# Patient Record
Sex: MALE | Race: WHITE | URBAN - METROPOLITAN AREA
[De-identification: names, ages, dates, MRNs, and addresses within clinical notes are randomized per-mention and may not be internally consistent; named-entity substitution may affect disease eponyms.]

---

## 2018-10-04 ENCOUNTER — HOSPITAL ENCOUNTER (OUTPATIENT)
Dept: MEDSURG UNIT | Facility: HOSPITAL | Age: 28
End: 2018-10-05
Attending: INTERNAL MEDICINE | Admitting: INTERNAL MEDICINE

## 2018-10-04 LAB
ABS NEUT (OLG): 13.3 X10(3)/MCL (ref 1.5–6.9)
ALBUMIN SERPL-MCNC: 6 GM/DL (ref 3.4–5)
ALBUMIN/GLOB SERPL: 1.4 RATIO
ALP SERPL-CCNC: 96 UNIT/L (ref 30–113)
ALT SERPL-CCNC: 28 UNIT/L (ref 10–45)
AMPHET UR QL SCN: NORMAL
AMYLASE SERPL-CCNC: 95 UNIT/L (ref 25–115)
APPEARANCE, UA: ABNORMAL
APTT PPP: 27.2 SECOND(S) (ref 25–35)
AST SERPL-CCNC: 24 UNIT/L (ref 15–37)
BACTERIA SPEC CULT: ABNORMAL /HPF
BARBITURATE SCN PRESENT UR: NORMAL
BASOPHILS # BLD AUTO: 0.1 X10(3)/MCL (ref 0–0.1)
BASOPHILS NFR BLD AUTO: 0 % (ref 0–1)
BENZODIAZ UR QL SCN: NORMAL
BILIRUB SERPL-MCNC: 0.9 MG/DL (ref 0.1–0.9)
BILIRUB UR QL STRIP: NEGATIVE
BILIRUBIN DIRECT+TOT PNL SERPL-MCNC: 0.2 MG/DL (ref 0–0.3)
BILIRUBIN DIRECT+TOT PNL SERPL-MCNC: 0.7 MG/DL
BUN SERPL-MCNC: 56 MG/DL (ref 10–20)
CALCIUM SERPL-MCNC: 10.4 MG/DL (ref 8–10.5)
CANNABINOIDS UR QL SCN: NORMAL
CHLORIDE SERPL-SCNC: 93 MMOL/L (ref 100–108)
CK SERPL-CCNC: 837 UNIT/L (ref 39–225)
CO2 SERPL-SCNC: 24 MMOL/L (ref 21–35)
COCAINE UR QL SCN: NORMAL
COLOR UR: ABNORMAL
CREAT SERPL-MCNC: 3.86 MG/DL (ref 0.7–1.3)
EOSINOPHIL # BLD AUTO: 0 X10(3)/MCL (ref 0–0.6)
EOSINOPHIL NFR BLD AUTO: 0 % (ref 0–5)
ERYTHROCYTE [DISTWIDTH] IN BLOOD BY AUTOMATED COUNT: 11.9 % (ref 11.5–17)
GLOBULIN SER-MCNC: 4.2 GM/DL
GLUCOSE (UA): NEGATIVE
GLUCOSE SERPL-MCNC: 115 MG/DL (ref 75–116)
HCT VFR BLD AUTO: 53.7 % (ref 42–52)
HGB BLD-MCNC: 18.7 GM/DL (ref 14–18)
HGB UR QL STRIP: ABNORMAL
HYALINE CASTS #/AREA URNS LPF: ABNORMAL /LPF
IMM GRANULOCYTES # BLD AUTO: 0.07 10*3/UL (ref 0–0.02)
IMM GRANULOCYTES NFR BLD AUTO: 0.4 % (ref 0–0.43)
INR PPP: 1.1 (ref 0–1.2)
KETONES UR QL STRIP: 15 MG/DL
LEUKOCYTE ESTERASE UR QL STRIP: NEGATIVE
LIPASE SERPL-CCNC: 165 UNIT/L (ref 21–261)
LYMPHOCYTES # BLD AUTO: 2.8 X10(3)/MCL (ref 0.5–4.1)
LYMPHOCYTES NFR BLD AUTO: 16 % (ref 15–40)
MCH RBC QN AUTO: 31 PG (ref 27–34)
MCHC RBC AUTO-ENTMCNC: 35 GM/DL (ref 31–36)
MCV RBC AUTO: 88 FL (ref 80–99)
MDMA UR QL SCN: NORMAL
METHADONE UR QL SCN: NORMAL
MONOCYTES # BLD AUTO: 0.9 X10(3)/MCL (ref 0–1.1)
MONOCYTES NFR BLD AUTO: 5 % (ref 4–12)
NEUTROPHILS # BLD AUTO: 13.3 X10(3)/MCL (ref 1.5–6.9)
NEUTROPHILS NFR BLD AUTO: 78 % (ref 43–75)
NITRITE UR QL STRIP: NEGATIVE
OPIATES UR QL SCN: NORMAL
PCP UR QL: NORMAL
PH UR STRIP.AUTO: 5.5 [PH] (ref 5–8)
PH UR STRIP: 5.5 [PH]
PLATELET # BLD AUTO: 344 X10(3)/MCL (ref 140–400)
PMV BLD AUTO: 9.8 FL (ref 6.8–10)
POTASSIUM SERPL-SCNC: 4.9 MMOL/L (ref 3.6–5.2)
PROT SERPL-MCNC: 10.2 GM/DL (ref 6.4–8.2)
PROT UR QL STRIP: 30 MG/DL
PROTHROMBIN TIME: 11.2 SECOND(S) (ref 9–12)
RBC # BLD AUTO: 6.07 X10(6)/MCL (ref 4.7–6.1)
RBC #/AREA URNS HPF: ABNORMAL /HPF
SODIUM SERPL-SCNC: 132 MMOL/L (ref 135–145)
SP GR UR STRIP: >=1.03
SQUAMOUS EPITHELIAL, UA: ABNORMAL /LPF
TEMPERATURE, URINE (OHS): 22.8 DEGC (ref 20–25)
UROBILINOGEN UR STRIP-ACNC: 0.2 EU/DL
WBC # SPEC AUTO: 17.1 X10(3)/MCL (ref 4.5–11.5)
WBC #/AREA URNS HPF: ABNORMAL /HPF

## 2018-10-05 LAB
ABS NEUT (OLG): 4.8 X10(3)/MCL (ref 1.5–6.9)
ALBUMIN SERPL-MCNC: 3.2 GM/DL (ref 3.4–5)
ALBUMIN/GLOB SERPL: 1.2 RATIO
ALP SERPL-CCNC: 56 UNIT/L (ref 30–113)
ALT SERPL-CCNC: 19 UNIT/L (ref 10–45)
AST SERPL-CCNC: 14 UNIT/L (ref 15–37)
BASOPHILS # BLD AUTO: 0.1 X10(3)/MCL (ref 0–0.1)
BASOPHILS NFR BLD AUTO: 1 % (ref 0–1)
BILIRUB SERPL-MCNC: 0.6 MG/DL (ref 0.1–0.9)
BILIRUBIN DIRECT+TOT PNL SERPL-MCNC: 0.2 MG/DL (ref 0–0.3)
BILIRUBIN DIRECT+TOT PNL SERPL-MCNC: 0.4 MG/DL
BUN SERPL-MCNC: 27 MG/DL (ref 10–20)
CALCIUM SERPL-MCNC: 8.1 MG/DL (ref 8–10.5)
CHLORIDE SERPL-SCNC: 108 MMOL/L (ref 100–108)
CK SERPL-CCNC: 325 UNIT/L (ref 39–225)
CO2 SERPL-SCNC: 26 MMOL/L (ref 21–35)
CREAT SERPL-MCNC: 1.02 MG/DL (ref 0.7–1.3)
EOSINOPHIL # BLD AUTO: 0.2 X10(3)/MCL (ref 0–0.6)
EOSINOPHIL NFR BLD AUTO: 2 % (ref 0–5)
ERYTHROCYTE [DISTWIDTH] IN BLOOD BY AUTOMATED COUNT: 12.3 % (ref 11.5–17)
GLOBULIN SER-MCNC: 2.6 GM/DL
GLUCOSE SERPL-MCNC: 99 MG/DL (ref 75–116)
HCT VFR BLD AUTO: 38.3 % (ref 42–52)
HGB BLD-MCNC: 12.8 GM/DL (ref 14–18)
IMM GRANULOCYTES # BLD AUTO: 0.02 10*3/UL (ref 0–0.02)
IMM GRANULOCYTES NFR BLD AUTO: 0.2 % (ref 0–0.43)
LYMPHOCYTES # BLD AUTO: 4.1 X10(3)/MCL (ref 0.5–4.1)
LYMPHOCYTES NFR BLD AUTO: 42 % (ref 15–40)
MAGNESIUM SERPL-MCNC: 2 MG/DL (ref 1.8–2.4)
MCH RBC QN AUTO: 31 PG (ref 27–34)
MCHC RBC AUTO-ENTMCNC: 33 GM/DL (ref 31–36)
MCV RBC AUTO: 92 FL (ref 80–99)
MONOCYTES # BLD AUTO: 0.7 X10(3)/MCL (ref 0–1.1)
MONOCYTES NFR BLD AUTO: 7 % (ref 4–12)
NEUTROPHILS # BLD AUTO: 4.8 X10(3)/MCL (ref 1.5–6.9)
NEUTROPHILS NFR BLD AUTO: 48 % (ref 43–75)
PLATELET # BLD AUTO: 251 X10(3)/MCL (ref 140–400)
PMV BLD AUTO: 9.7 FL (ref 6.8–10)
POTASSIUM SERPL-SCNC: 4.8 MMOL/L (ref 3.6–5.2)
PROT SERPL-MCNC: 5.8 GM/DL (ref 6.4–8.2)
RBC # BLD AUTO: 4.17 X10(6)/MCL (ref 4.7–6.1)
SODIUM SERPL-SCNC: 140 MMOL/L (ref 135–145)
WBC # SPEC AUTO: 9.9 X10(3)/MCL (ref 4.5–11.5)

## 2018-10-06 LAB — FINAL CULTURE: NO GROWTH

## 2022-04-29 NOTE — ED PROVIDER NOTES
Patient:   Lamin Peña             MRN: [id number]            FIN: [id number]               Age:   27 years     Sex:  Male     :  1990   Associated Diagnoses:   Dehydration   Author:   Camilo Ortiz MD      Basic Information   Time seen: Date & time 10/4/2018 12:18:00.   History source: Patient.   Arrival mode: Private vehicle, walking.   History limitation: None.   Additional information: Chief Complaint from Nursing Triage Note : Chief Complaint   10/4/2018 12:20 CDT      Chief Complaint           N,V abd pain for last 2-3 days   working outside  and not able to keep fluids down...  .      History of Present Illness   The patient presents with nausea and vomiting.  The onset was 3  days ago.  The course/duration of symptoms is constant.  The character of symptoms is clear.  The degree at present is minimal.  There are exacerbating factors including eating and drinking.  The relieving factor is none.  Risk factors consist of Patient states isn't working outside the last 3 days.  Therapy today: none.  Associated symptoms: abdominal pain.  Additional history: Patient works outside in the heat.  Patient feels he is dehydrated..        Review of Systems   Constitutional symptoms:  Negative except as documented in HPI.   Skin symptoms:  Negative except as documented in HPI.   Eye symptoms:  Negative except as documented in HPI.   ENMT symptoms:  Negative except as documented in HPI.   Respiratory symptoms:  Negative except as documented in HPI.   Cardiovascular symptoms:  Negative except as documented in HPI.   Gastrointestinal symptoms:  Negative except as documented in HPI.   Genitourinary symptoms:  Negative except as documented in HPI.   Musculoskeletal symptoms:  Negative except as documented in HPI.   Neurologic symptoms:  Negative except as documented in HPI.   Psychiatric symptoms:  Negative except as documented in HPI.             Additional review of systems information: All other systems  reviewed and otherwise negative.      Health Status   Allergies:    Allergic Reactions (Selected)  No Known Medication Allergies,    Allergies (1) Active Reaction  No Known Medication Allergies None Documented  .      Past Medical/ Family/ Social History   Medical history: Negative.   Surgical history: Negative.   Family history: Not significant.   Social history:    Social & Psychosocial Habits    Alcohol  10/04/2018  Use: Never    Home/Environment  10/04/2018  Living situation: Home/Independent    Substance Abuse  10/04/2018  Use: Never    Tobacco  10/04/2018  Use: Never smoker    Smoking Cessation Counseling N/A  , Tobacco use: Denies.   Problem list:    No qualifying data available  .      Physical Examination               Vital Signs   Vital Signs   10/4/2018 12:20 CDT      Temperature Temporal Artery               36.6 DegC                             Peripheral Pulse Rate     90 bpm                             Respiratory Rate          20 br/min                             SpO2                      99 %                             Oxygen Therapy            Room air                             Systolic Blood Pressure   149 mmHg  HI                             Diastolic Blood Pressure  99 mmHg  HI                             Mean Arterial Pressure, Cuff              116 mmHg  .      Vital Signs (last 24 hrs)_____  Last Charted___________  Heart Rate Peripheral   90 bpm  (OCT 04 12:20)  Resp Rate         20 br/min  (OCT 04 12:20)  SBP      H 149mmHg  (OCT 04 12:20)  DBP      H 99mmHg  (OCT 04 12:20)  SpO2      99 %  (OCT 04 12:20)  Weight      75 kg  (OCT 04 12:20)  .   Measurements   10/4/2018 12:20 CDT      Weight Dosing             75 kg                             Weight Measured           75 kg                             Weight Measured and Calculated in Lbs     165.34 lb  .   Basic Oxygen Information   10/4/2018 12:20 CDT      SpO2                      99 %                             Oxygen Therapy             Room air  .   General:  Alert, no acute distress.    Skin:  Warm, dry, pink, intact.    Head:  Normocephalic, atraumatic.    Neck:  Supple, trachea midline, no tenderness, no JVD.    Eye:  Pupils are equal, round and reactive to light, extraocular movements are intact, normal conjunctiva.    Cardiovascular:  Regular rate and rhythm, No murmur, Normal peripheral perfusion, No edema.    Respiratory:  Lungs are clear to auscultation, respirations are non-labored, breath sounds are equal.    Chest wall:  No tenderness, No deformity.    Back:  Nontender, Normal range of motion, Normal alignment.    Musculoskeletal:  Normal ROM, normal strength, no tenderness, no swelling, no deformity.    Gastrointestinal:  Soft, Nontender, Non distended, Normal bowel sounds, No organomegaly.    Neurological:  Alert and oriented to person, place, time, and situation, No focal neurological deficit observed.    Lymphatics:  No lymphadenopathy.   Psychiatric:  Cooperative, appropriate mood & affect, normal judgment.       Medical Decision Making   Orders  Launch Orders   Laboratory:  Lipase Level (Order): Stat collect, 10/4/2018 12:27 CDT, Blood, Lab Collect, 10/4/2018 12:27 CDT  Amylase Level (Order): Stat collect, 10/4/2018 12:27 CDT, Blood, Lab Collect, 10/4/2018 12:27 CDT  Drug Screen Urine [facility] (Order): Stat collect, Urine, 10/4/2018 12:27 CDT, Nurse collect  Urinalysis with Microscopic if Indicated (Order): Stat collect, Urine, 10/4/2018 12:27 CDT, Nurse collect, 10/4/2018 12:27 CDT  PTT (Order): Stat collect, 10/4/2018 12:26 CDT, Blood, Lab Collect, 10/4/2018 12:26 CDT  INR - Protime (Order): Stat collect, 10/4/2018 12:26 CDT, Blood, Lab Collect, 10/4/2018 12:26 CDT  CMP (Order): Stat collect, 10/4/2018 12:26 CDT, Blood, Lab Collect, 10/4/2018 12:26 CDT  CBC w/ Auto Diff (Order): Now collect, 10/4/2018 12:26 CDT, Blood, Lab Collect, 10/4/2018 12:26 CDT  Pharmacy:  Sodium Chloride 0.9% intravenous solution (IVF Normal Saline NS  Bolus 1000ml) (Order): 1,000 mL, 1,000 mL, IV, start date 10/4/2018 12:27 CDT, stop date 10/4/2018 12:27 CDT  Radiology:  XR Abdomen Flat and Erect (Order): Stat, 10/4/2018 12:27 CDT, Abdominal Pain, None, Stretcher, Rad Type, Not Scheduled.   Results review:  Lab results : Lab View   10/4/2018 12:38 CDT      Sodium Lvl                132 mmol/L  LOW                             Potassium Lvl             4.9 mmol/L                             Chloride                  93 mmol/L  LOW                             CO2                       24 mmol/L                             Calcium Lvl               10.4 mg/dL                             Glucose Lvl               115 mg/dL                             BUN                       56 mg/dL  HI                             Creatinine                3.86 mg/dL  HI                             eGFR-AA                   24 mL/min/1.73 m2  NA                             eGFR-HEAVENLY                  20 mL/min/1.73 m2  NA                             Amylase Lvl               95 unit/L                             Bili Total                0.9 mg/dL                             Bili Direct               0.20 mg/dL                             Bili Indirect             0.70 mg/dL  NA                             AST                       24 unit/L                             ALT                       28 unit/L                             Alk Phos                  96 unit/L                             Total Protein             10.2 gm/dL  HI                             Albumin Lvl               6.0 gm/dL  HI                             Globulin                  4.20 gm/dL  NA                             A/G Ratio                 1.4 ratio  NA                             Lipase Lvl                165 unit/L                             PT                        11.2 second(s)                             INR                       1.1                             PTT                       27.2  second(s)                             WBC                       17.1 x10(3)/mcL  HI                             RBC                       6.07 x10(6)/mcL                             Hgb                       18.7 gm/dL  HI                             Hct                       53.7 %  HI                             Platelet                  344 x10(3)/mcL                             MCV                       88 fL                             MCH                       31 pg                             MCHC                      35 gm/dL                             RDW                       11.9 %                             MPV                       9.8 fL                             Abs Neut                  13.3 x10(3)/mcL  HI                             Neutro Auto               78 %  HI                             Lymph Auto                16 %                             Mono Auto                 5 %                             Eos Auto                  0 %                             Abs Eos                   0.0 x10(3)/mcL                             Basophil Auto             0 %                             Abs Neutro                13.3 x10(3)/mcL  HI                             Abs Lymph                 2.8 x10(3)/mcL                             Abs Mono                  0.9 x10(3)/mcL                             Abs Baso                  0.1 x10(3)/mcL                             IG%                       0.400 %                             IG#                       0.0700  HI  ,    No qualifying data available.       Reexamination/ Reevaluation   Vital signs   Basic Oxygen Information   10/4/2018 12:20 CDT      SpO2                      99 %                             Oxygen Therapy            Room air        Impression and Plan   Diagnosis   Dehydration ([id number] E86.0)      Calls-Consults   -  10/4/2018 15:50:00 , Lo BROOKS, Velma Mckeon to admit.    Plan   Condition: Improved, Stable.     Disposition: Admit time  10/4/2018 16:02:00, Place in Observation Telemetry Unit.    Counseled: Patient, Family, Regarding diagnosis, Regarding diagnostic results, Regarding treatment plan, Regarding prescription, Patient indicated understanding of instructions.    Orders: Launch Orders   Admit/Transfer/Discharge:  Admit to Outpatient Observation (Order): 10/4/2018 16:03 KEKET, Lo BROOKS, [facility], No.

## 2022-05-02 NOTE — HISTORICAL [facility]
This is a historical note converted from Heather. Formatting and pictures may have been removed.  Please reference Heather for original formatting and attached multimedia. Chief Complaint  N,V abd pain for last 2-3 days working outside and not able to keep fluids down...  History of Present Illness  26yo male presents to the ED today c/o N/V for the last 2-3 days.? He states that he does work outside in the heat and has not been able to keep hardly any fluids down.? He does describe some abdominal cramping which is generalized. No apparent fever/chills.?No diarrhea.? He has not been able to urinate.?His WBC was elevated at 17 upon admit and his renal indices were elevated consistent with severe dehydration.? There was not a CPK performed but he does have some blood in his urine.? He was also + for amphetamines and THC for which he denies.? He will be admitted for IV fluids and antiemetics.  Review of Systems  ?  ?????Constitutional: ?No fever, No chills, No sweats,?+ weakness,?+ fatigue. ?  ?????Eye: ?No double vision, No dry eyes, No photophobia. ?  ?????Ear/Nose/Mouth/Throat: ?No ear pain, No nasal congestion, No sore throat. ?  ?????Respiratory: ?No shortness of breath, No cough, No sputum production, No hemoptysis, No wheezing, No pleuritic pain. ?  ?????Cardiovascular: ?No chest pain, No palpitations, No peripheral edema, No syncope. ?  ?????Gastrointestinal:??+ nausea,?+ vomiting, No diarrhea, No constipation, No heartburn,?+ abdominal pain. ?  ?????Genitourinary: ?No dysuria, No hematuria, No change in urine stream. ?decreased urination  ?????Hematology/Lymphatics: ?No anemia, No bruising tendency, No bruise, No hemorrhage, No petechiae. ?  ?????Endocrine: ?No excessive thirst, No polyuria, No cold intolerance. ?  ?????Immunologic: ?No recurrent fevers, No recurrent infections. ?  ?????Musculoskeletal: ?Joint pain, No back pain, No muscle pain, No decreased range of motion. ?  ?????Integumentary: ?No rash, No  pruritus, No petechiae, No skin lesion. ?  ?????Neurologic: ?Alert and oriented X4, No abnormal balance, No confusion, No tingling. ?  ?????Psychiatric: ?No anxiety, No depression. ?  Physical Exam  Vitals & Measurements  T:?36.6? ?C (Temporal Artery)? HR:?90(Peripheral)? RR:?20? BP:?149/99? SpO2:?99%? WT:?75?kg? WT:?75?kg?  General: ?Alert and oriented, No acute distress. ?  ??????? ? Appearance: Well nourished. ?  ??????? ? Behavior: Appropriate. ?  ??????? ? Skin: Normal for ethnicity. ?  ?????Eye: ?Pupils are equal, round and reactive to light, Extraocular movements are intact. ?  ?????HENT: ?Normocephalic, Normal hearing, Oral mucosa is moist, No pharyngeal erythema. ?dry mucous membranes  ?????Neck: ?Supple, Non-tender, No carotid bruit, No jugular venous distention, No lymphadenopathy, No thyromegaly. ?  ?????Respiratory: ?Lungs are clear to auscultation, Breath sounds are equal, No chest wall tenderness. ?  ?????Cardiovascular: ?Normal rate, Regular rhythm, No murmur, No gallop, Good pulses equal in all extremities, Normal peripheral perfusion, No edema. ?  ?????Gastrointestinal: ?Soft, Non-distended, Normal bowel sounds, No organomegaly. ?mild TTP which is diffuse.  ?????Genitourinary: ?No costovertebral angle tenderness, No urethral discharge. ?  ?????Lymphatics: ?No lymphadenopathy neck, axilla, groin. ?  ?????Musculoskeletal: ?Normal range of motion, Normal strength, No tenderness, No swelling, Normal gait. ?  ?????Integumentary: ?Warm, Dry, Pink, Intact, No rash. ?tattoos  ?????Neurologic: ?Alert, Oriented, Normal sensory, Normal motor function, No focal deficits, Cranial Nerves II-XII are grossly intact. ?  ?????Psychiatric: ?Cooperative, Appropriate mood & affect, Normal judgment. ?  Assessment/Plan  1.?Acute renal failure  2.?Polysubstance abuse  Dehydration  Vomiting  Orders:  CBC w/ Auto Diff, AM Next collect, 10/05/18 5:00:00 CDT, Blood, Stop date 10/05/18 5:00:00 CDT, Lab Collect, 10/05/18  5:00:00 CDT  Comprehensive Metabolic Panel, Routine collect, 10/05/18 5:00:00 CDT, Blood, Stop date 10/05/18 5:00:00 CDT, Lab Collect, in AM, 10/05/18 5:00:00 CDT  Magnesium Level, AM Next collect, 10/05/18 5:00:00 CDT, Blood, Stop date 10/05/18 5:00:00 CDT, Lab Collect, 10/05/18 5:00:00 CDT  IV fluids  follow labs  DVT prophylaxis  check CPK stat   Problem List/Past Medical History  Ongoing  No qualifying data  Historical  No qualifying data  Medications  Inpatient  IVF Normal Saline NS Bolus 1000ml 1,000 mL, 1000 mL, IV  Home  No active home medications  Allergies  No Known Medication Allergies  Social History  Alcohol  Never, 10/04/2018  Home/Environment  Living situation: Home/Independent., 10/04/2018  Substance Abuse  Never, 10/04/2018  Tobacco  Never smoker, N/A, 10/04/2018  Lab Results  Test Name Test Result Date/Time   Sodium Lvl 132 mmol/L (Low) 10/04/2018 12:38 CDT   Potassium Lvl 4.9 mmol/L 10/04/2018 12:38 CDT   Chloride 93 mmol/L (Low) 10/04/2018 12:38 CDT   CO2 24 mmol/L 10/04/2018 12:38 CDT   Glucose Lvl 115 mg/dL 10/04/2018 12:38 CDT   BUN 56 mg/dL (High) 10/04/2018 12:38 CDT   Creatinine 3.86 mg/dL (High) 10/04/2018 12:38 CDT   INR 1.1 10/04/2018 12:38 CDT   WBC 17.1 x10(3)/mcL (High) 10/04/2018 12:38 CDT   Hgb 18.7 gm/dL (High) 10/04/2018 12:38 CDT   Hct 53.7 % (High) 10/04/2018 12:38 CDT   Platelet 344 x10(3)/mcL 10/04/2018 12:38 CDT

## 2022-05-02 NOTE — HISTORICAL [facility]
This is a historical note converted from Heather. Formatting and pictures may have been removed.  Please reference Heather for original formatting and attached multimedia. Hospital Course  28yo male admitted for acute renal failure due to severe dehydration.? Upon admit his BUN was 56 and creatinine was 3.86.? He was placed on IV fluids with normalization of his renal indices.? He is feeling better today and is stable for discharge home.? He is advised to keep himself hydrated.  Physical Exam  Vitals & Measurements  T:?36.9? ?C (Oral)? TMIN:?36.6? ?C (Temporal Artery)? TMAX:?37.1? ?C (Oral)? HR:?55(Monitored)? RR:?20? BP:?116/68? SpO2:?98%? WT:?75?kg?  General: ?Alert and oriented, No acute distress. ?  ??????? ? Appearance: Well nourished. ?  ??????? ? Behavior: Appropriate. ?  ??????? ? Skin: Normal for ethnicity. ?  ?????Eye: ?Pupils are equal, round and reactive to light, Extraocular movements are intact. ?  ?????HENT: ?Normocephalic, Normal hearing, Oral mucosa is moist, No pharyngeal erythema. ?  ?????Neck: ?Supple, Non-tender, No carotid bruit, No jugular venous distention, No lymphadenopathy, No thyromegaly. ?  ?????Respiratory: ?Lungs are clear to auscultation, Breath sounds are equal, No chest wall tenderness. ?  ?????Cardiovascular: ?Normal rate, Regular rhythm, No murmur, No gallop, Good pulses equal in all extremities, Normal peripheral perfusion, No edema. ?  ?????Gastrointestinal: ?Soft, Non-tender, Non-distended, Normal bowel sounds, No organomegaly. ?  ?????Genitourinary: ?No costovertebral angle tenderness, No urethral discharge. ?  ?????Lymphatics: ?No lymphadenopathy neck, axilla, groin. ?  ?????Musculoskeletal: ?Normal range of motion, Normal strength, No tenderness, No swelling, Normal gait. ?  ?????Integumentary: ?Warm, Dry, Pink, Intact, No rash. ?tatoos  ?????Neurologic: ?Alert, Oriented, Normal sensory, Normal motor function, No focal deficits, Cranial Nerves II-XII are grossly intact.  ?  ?????Psychiatric: ?Cooperative, Appropriate mood & affect, Normal judgment. ?  Discharge Plan  1.?Acute renal failure  2.?Polysubstance abuse  Dehydration  Vomiting  Orders:  Discharge, 10/05/18 9:27:00 CDT, Home  Discharge Activity, Activity as Tolerated, keep hydrated  Discharge Diet, Regular  D/C=34mins  Patient Discharge Condition  stable  Discharge Disposition  home